# Patient Record
Sex: FEMALE | Race: BLACK OR AFRICAN AMERICAN | URBAN - METROPOLITAN AREA
[De-identification: names, ages, dates, MRNs, and addresses within clinical notes are randomized per-mention and may not be internally consistent; named-entity substitution may affect disease eponyms.]

---
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
## 2022-04-10 ENCOUNTER — HISTORICAL (OUTPATIENT)
Dept: ADMINISTRATIVE | Facility: HOSPITAL | Age: 22
End: 2022-04-10

## 2022-04-28 VITALS
BODY MASS INDEX: 29.41 KG/M2 | SYSTOLIC BLOOD PRESSURE: 104 MMHG | DIASTOLIC BLOOD PRESSURE: 67 MMHG | WEIGHT: 183 LBS | HEIGHT: 66 IN | OXYGEN SATURATION: 99 %

## 2022-05-04 NOTE — HISTORICAL [facility] CERNER
This is a historical note converted from [employer]. Formatting and pictures may have been removed.  Please reference [employer] for original formatting and attached multimedia. Chief Complaint  rt side pain /abdominal pain  History of Present Illness  Patient is a 17-year-old -American female who presents today with abdominal pain?upper right quadrant.? Patient denies fever?and denies?soda intake.? Patient was seen in the ED On 12/28/2017 for similar symptoms?and was diagnosed with UTI?treated with Macrobid.? Patient states she completed her medication?and had relief of symptoms.  Review of Systems  Constitutional: negative except as stated in HPI  Eye: negative except as stated in HPI  ENMT: negative except as stated in HPI  Respiratory: negative except as stated in HPI  Cardiovascular: negative except as stated in HPI  Gastrointestinal: negative except as stated in HPI  Genitourinary: negative except as stated in HPI  Hema/Lymph: negative except as stated in HPI  Endocrine: negative except as stated in HPI  Immunologic: negative except as stated in HPI  Musculoskeletal: negative except as stated in HPI  Integumentary: negative except as stated in HPI  Neurologic: negative except as stated in HPI  All Other ROS_ ?negative except as stated in HPI  ?  Physical Exam  Vitals & Measurements  T:?37.0? ?C ?(Oral)? HR:?99?(Peripheral)? RR:?20? BP:?107/66? SpO2:?99%?  HT:?165?cm? HT:?165?cm? WT:?83?kg? WT:?83?kg? BMI:?30.49?  General: Alert and oriented, No acute distress.  Eye: Pupils are equal, round and reactive to light, Extraocular movements are intact.  HENT: Normocephalic.  Respiratory: Lungs are clear to auscultation, Respirations are non-labored, Breath sounds are equal, Symmetrical chest wall expansion.  Cardiovascular: Normal rate, Regular rhythm, No murmur.  Gastrointestinal: Soft, mild right upper?quadrant tenderness, Non-distended, Normal bowel sounds. Negative CVA tenderness.  Musculoskeletal: Normal range of  motion.  Integumentary: Warm, Dry, Intact.  Neurologic: No focal deficits.  ?  ?  ?  Assessment/Plan  1.?Abdominal pain,? Flank pain  ? Adequate fluid intake  Gas reducer  ED precautions if symptoms worsen  Ordered:  omeprazole, 40 mg = 1 cap(s), Oral, Daily, # 15 cap(s), 0 Refill(s), [facility]: [id number]  sulfamethoxazole-trimethoprim, 1 tab(s), Oral, BID, X 10 day(s), # 20 tab(s), 0 Refill(s), [facility]: [id number]  Urine Culture [id number], Routine collect, 01/09/18 21:44:00 CST, Urine, Nurse collect, Stop date 01/09/18 21:44:00 CST, Abdominal pain  ?  Follow up with PCP or return to clinic if symptoms not improved in 2-3 days. ED precautions if symptoms worsen or clinics are closed.   Problem List/Past Medical History  Ongoing  Obesity  Historical  Medications  Acidophilus Probiotic Blend oral capsule, 1 cap(s), Oral, Daily  Bactrim  mg-160 mg oral tablet, 1 tab(s), Oral, BID  Dulcolax Stool Softener 100 mg oral capsule, 100 mg= 1 cap(s), Oral, BID, PRN,? ?Not taking  FLORANEX TABLETS, 1 tab(s), Oral, Daily  LOW-OGESTREL TAB, 1 tab(s), Oral, Daily  MiraLax oral powder for reconstitution, 17 gm, Oral, TID,? ?Not taking  NAPROXEN SOD TAB 550MG, 550 mg= 1 tab(s), Oral, BID,? ?Not taking  NITROFURANTN CAP 100MG, Oral  NORETH/ETHIN TAB 1/20  omeprazole 40 mg oral DR capsule, 40 mg= 1 cap(s), Oral, Daily  Zofran ODT 4 mg oral tablet, disintegrating, 4 mg= 1 tab(s), Oral, q8hr, PRN,? ?Not taking  Allergies  No Known Medication Allergies  Social History  Tobacco - 01/09/2018  Never smoker

## 2024-07-31 ENCOUNTER — LAB VISIT (OUTPATIENT)
Dept: LAB | Facility: HOSPITAL | Age: 24
End: 2024-07-31
Attending: PHYSICAL MEDICINE & REHABILITATION
Payer: COMMERCIAL

## 2024-07-31 DIAGNOSIS — G44.89 HEADACHE SYNDROME: ICD-10-CM
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
## 2024-07-31 DIAGNOSIS — M54.12 RADICULOPATHY, CERVICAL: ICD-10-CM

## 2024-07-31 DIAGNOSIS — M54.6 PAIN IN THORACIC SPINE: ICD-10-CM

## 2024-07-31 LAB
ALBUMIN SERPL-MCNC: 4.1 G/DL (ref 3.5–5)
ALBUMIN/GLOB SERPL: 1 RATIO (ref 1.1–2)
ALP SERPL-CCNC: 56 UNIT/L (ref 40–150)
ALT SERPL-CCNC: 14 UNIT/L (ref 0–55)
ANION GAP SERPL CALC-SCNC: 7 MEQ/L
AST SERPL-CCNC: 13 UNIT/L (ref 5–34)
BILIRUB SERPL-MCNC: 0.2 MG/DL
BUN SERPL-MCNC: 7.7 MG/DL (ref 7–18.7)
CALCIUM SERPL-MCNC: 10.3 MG/DL (ref 8.4–10.2)
CHLORIDE SERPL-SCNC: 107 MMOL/L (ref 98–107)
CO2 SERPL-SCNC: 25 MMOL/L (ref 22–29)
CREAT SERPL-MCNC: 0.8 MG/DL (ref 0.55–1.02)
CREAT/UREA NIT SERPL: 10
GFR SERPLBLD CREATININE-BSD FMLA CKD-EPI: >60 ML/MIN/1.73/M2
GLOBULIN SER-MCNC: 4.1 GM/DL (ref 2.4–3.5)
GLUCOSE SERPL-MCNC: 97 MG/DL (ref 74–100)
POTASSIUM SERPL-SCNC: 4.2 MMOL/L (ref 3.5–5.1)
PROT SERPL-MCNC: 8.2 GM/DL (ref 6.4–8.3)
SODIUM SERPL-SCNC: 139 MMOL/L (ref 136–145)

## 2024-07-31 PROCEDURE — 80053 COMPREHEN METABOLIC PANEL: CPT

## 2024-07-31 PROCEDURE — 36415 COLL VENOUS BLD VENIPUNCTURE: CPT
